# Patient Record
Sex: MALE | Race: WHITE | NOT HISPANIC OR LATINO | Employment: STUDENT | URBAN - METROPOLITAN AREA
[De-identification: names, ages, dates, MRNs, and addresses within clinical notes are randomized per-mention and may not be internally consistent; named-entity substitution may affect disease eponyms.]

---
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
## 2022-04-30 ENCOUNTER — APPOINTMENT (OUTPATIENT)
Dept: RADIOLOGY | Age: 22
End: 2022-04-30
Payer: OTHER MISCELLANEOUS

## 2022-04-30 ENCOUNTER — OCCMED (OUTPATIENT)
Dept: URGENT CARE | Age: 22
End: 2022-04-30
Payer: OTHER MISCELLANEOUS

## 2022-04-30 DIAGNOSIS — M25.562 ACUTE PAIN OF LEFT KNEE: ICD-10-CM

## 2022-04-30 DIAGNOSIS — M25.562 ACUTE PAIN OF LEFT KNEE: Primary | ICD-10-CM

## 2022-04-30 PROCEDURE — 99283 EMERGENCY DEPT VISIT LOW MDM: CPT

## 2022-04-30 PROCEDURE — G0382 LEV 3 HOSP TYPE B ED VISIT: HCPCS

## 2022-04-30 PROCEDURE — 73564 X-RAY EXAM KNEE 4 OR MORE: CPT

## 2022-07-22 ENCOUNTER — APPOINTMENT (OUTPATIENT)
Dept: URGENT CARE | Facility: CLINIC | Age: 22
End: 2022-07-22
Payer: OTHER MISCELLANEOUS

## 2022-07-22 PROCEDURE — G0382 LEV 3 HOSP TYPE B ED VISIT: HCPCS | Performed by: PHYSICIAN ASSISTANT

## 2022-07-22 PROCEDURE — 99283 EMERGENCY DEPT VISIT LOW MDM: CPT

## 2022-07-22 PROCEDURE — G0382 LEV 3 HOSP TYPE B ED VISIT: HCPCS

## 2022-07-26 ENCOUNTER — OFFICE VISIT (OUTPATIENT)
Dept: URGENT CARE | Facility: CLINIC | Age: 22
End: 2022-07-26
Payer: MEDICARE

## 2022-07-26 VITALS
SYSTOLIC BLOOD PRESSURE: 117 MMHG | WEIGHT: 142.8 LBS | RESPIRATION RATE: 16 BRPM | DIASTOLIC BLOOD PRESSURE: 73 MMHG | BODY MASS INDEX: 20.44 KG/M2 | TEMPERATURE: 97.6 F | HEART RATE: 77 BPM | HEIGHT: 70 IN | OXYGEN SATURATION: 97 %

## 2022-07-26 DIAGNOSIS — H10.13 ALLERGIC CONJUNCTIVITIS OF BOTH EYES: Primary | ICD-10-CM

## 2022-07-26 PROCEDURE — G0382 LEV 3 HOSP TYPE B ED VISIT: HCPCS | Performed by: PHYSICIAN ASSISTANT

## 2022-07-26 RX ORDER — OLOPATADINE HYDROCHLORIDE 1 MG/ML
1 SOLUTION/ DROPS OPHTHALMIC 2 TIMES DAILY
Qty: 5 ML | Refills: 0 | Status: SHIPPED | OUTPATIENT
Start: 2022-07-26

## 2022-07-26 NOTE — PROGRESS NOTES
[facility]        NAME: Tory Cotton is a 23 y o  male  : 2000    MRN: [id number]  DATE: 2022  TIME: 5:27 PM    Assessment and Plan   Allergic conjunctivitis of both eyes [H10 13]  1  Allergic conjunctivitis of both eyes  olopatadine (PATANOL) 0 1 % ophthalmic solution         Patient Instructions       Follow up with PCP in 3-5 days  Proceed to  ER if symptoms worsen  Chief Complaint     Chief Complaint   Patient presents with    Eye Problem     B/L eyes are red started 3 weeks ago  History of Present Illness       Patient is a 24 yo male who presents with a cc of bilateral eye redness x 3 weeks  Associated watery discharge and itching  He reports that the symptoms have been occurring off and on and that 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  He does have seasonal allergies  Denies eye pain, visual changes, light sensitivity, foreign body sensation       Review of Systems   Review of Systems   Constitutional: Negative for fever  Eyes: Positive for discharge, redness and itching  Negative for photophobia, pain and visual disturbance  Neurological: Negative for dizziness and headaches           Current Medications       Current Outpatient Medications:     olopatadine (PATANOL) 0 1 % ophthalmic solution, Administer 1 drop to both eyes 2 (two) times a day, Disp: 5 mL, Rfl: 0    benzonatate (TESSALON PERLES) 100 mg capsule, Take 1 capsule (100 mg total) by mouth 3 (three) times a day as needed for cough (Patient not taking: Reported on 2022), Disp: 20 capsule, Rfl: 0    Current Allergies     Allergies as of 2022 - Reviewed 2022   Allergen Reaction Noted    Penicillins  2017            The following portions of the patient's history were reviewed and updated as appropriate: allergies, current medications, past family history, past medical history, past social history, past surgical history and problem list      Past Medical History:   Diagnosis Date    ADHD (attention deficit hyperactivity disorder)        History reviewed  No pertinent surgical history  No family history on file  Medications have been verified  Objective   /73   Pulse 77   Temp 97 6 °F (36 4 °C)   Resp 16   Ht 5' 10" (1 778 m)   Wt 64 8 kg (142 lb 12 8 oz)   SpO2 97%   BMI 20 49 kg/m²        Physical Exam     Physical Exam  Constitutional:       General: He is not in acute distress  Appearance: He is not toxic-appearing  Eyes:      Extraocular Movements: Extraocular movements intact  Pupils: Pupils are equal, round, and reactive to light  Comments: Bilateral conjunctival erythema and watery discharge    Cardiovascular:      Rate and Rhythm: Normal rate  Pulmonary:      Effort: Pulmonary effort is normal    Skin:     General: Skin is warm and dry  Neurological:      Mental Status: He is alert     Psychiatric:         Mood and Affect: Mood normal          Behavior: Behavior normal

## 2024-07-18 ENCOUNTER — APPOINTMENT (EMERGENCY)
Dept: RADIOLOGY | Facility: HOSPITAL | Age: 24
End: 2024-07-18
Payer: COMMERCIAL

## 2024-07-18 ENCOUNTER — HOSPITAL ENCOUNTER (EMERGENCY)
Facility: HOSPITAL | Age: 24
Discharge: HOME | End: 2024-07-18
Payer: COMMERCIAL

## 2024-07-18 VITALS
RESPIRATION RATE: 18 BRPM | BODY MASS INDEX: 21.47 KG/M2 | OXYGEN SATURATION: 100 % | WEIGHT: 150 LBS | DIASTOLIC BLOOD PRESSURE: 57 MMHG | SYSTOLIC BLOOD PRESSURE: 109 MMHG | HEIGHT: 70 IN | HEART RATE: 74 BPM | TEMPERATURE: 98.1 F

## 2024-07-18 DIAGNOSIS — S51.811A LACERATION OF RIGHT FOREARM, INITIAL ENCOUNTER: Primary | ICD-10-CM

## 2024-07-18 PROCEDURE — 73090 X-RAY EXAM OF FOREARM: CPT

## 2024-07-18 PROCEDURE — 90471 IMMUNIZATION ADMIN: CPT

## 2024-07-18 PROCEDURE — 99284 EMERGENCY DEPT VISIT MOD MDM: CPT | Performed by: PHYSICIAN ASSISTANT

## 2024-07-18 PROCEDURE — 90715 TDAP VACCINE 7 YRS/> IM: CPT | Performed by: PHYSICIAN ASSISTANT

## 2024-07-18 PROCEDURE — 99283 EMERGENCY DEPT VISIT LOW MDM: CPT

## 2024-07-18 RX ORDER — ACETAMINOPHEN 325 MG/1
975 TABLET ORAL ONCE
Status: COMPLETED | OUTPATIENT
Start: 2024-07-18 | End: 2024-07-18

## 2024-07-18 RX ORDER — LIDOCAINE HYDROCHLORIDE AND EPINEPHRINE 10; 10 MG/ML; UG/ML
10 INJECTION, SOLUTION INFILTRATION; PERINEURAL ONCE
Status: COMPLETED | OUTPATIENT
Start: 2024-07-18 | End: 2024-07-18

## 2024-07-18 RX ORDER — IBUPROFEN 400 MG/1
400 TABLET ORAL ONCE
Status: COMPLETED | OUTPATIENT
Start: 2024-07-18 | End: 2024-07-18

## 2024-07-18 RX ORDER — DOXYCYCLINE HYCLATE 100 MG/1
100 CAPSULE ORAL 2 TIMES DAILY
Qty: 20 CAPSULE | Refills: 0 | Status: SHIPPED | OUTPATIENT
Start: 2024-07-18 | End: 2024-07-25

## 2024-07-18 RX ADMIN — LIDOCAINE HYDROCHLORIDE,EPINEPHRINE BITARTRATE 10 ML: 10; .01 INJECTION, SOLUTION INFILTRATION; PERINEURAL at 17:20

## 2024-07-18 RX ADMIN — TETANUS TOXOID, REDUCED DIPHTHERIA TOXOID AND ACELLULAR PERTUSSIS VACCINE, ADSORBED 0.5 ML: 5; 2.5; 8; 8; 2.5 SUSPENSION INTRAMUSCULAR at 17:20

## 2024-07-18 RX ADMIN — IBUPROFEN 400 MG: 400 TABLET, FILM COATED ORAL at 17:20

## 2024-07-18 RX ADMIN — ACETAMINOPHEN 975 MG: 325 TABLET, FILM COATED ORAL at 17:20

## 2024-07-18 NOTE — ED PROVIDER NOTES
History  Chief Complaint   Patient presents with    Laceration     From [facility] via ems for R forearm lac. Pt was removing parts from car at [employer] and glass shattered puncturing his R forearm. He wrapped it and went to express care. At express care he felt syncopal when he looked at the wound. Denies LOC or syncopal episode. Bleeding is controlled. Denies thinners       Laceration  Location:  Shoulder/arm  Shoulder/arm laceration location:  R forearm  Length:  2.5  Depth:  Through dermis  Quality: stellate    Bleeding: venous    Laceration mechanism:  Blunt object and broken glass  Pain details:     Quality:  Aching    Severity:  Mild    Progression:  Improving  Foreign body present:  No foreign bodies  Tetanus status:  Unknown  Associated symptoms: no fever, no focal weakness and no numbness        Prior to Admission Medications   Prescriptions Last Dose Informant Patient Reported? Taking?   benzonatate (TESSALON PERLES) 100 mg capsule   No No   Sig: Take 1 capsule (100 mg total) by mouth 3 (three) times a day as needed for cough   Patient not taking: Reported on 7/26/2022   olopatadine (PATANOL) 0.1 % ophthalmic solution   No No   Sig: Administer 1 drop to both eyes 2 (two) times a day      Facility-Administered Medications: None       Past Medical History:   Diagnosis Date    ADHD (attention deficit hyperactivity disorder)        History reviewed. No pertinent surgical history.    History reviewed. No pertinent family history.  I have reviewed and agree with the history as documented.    E-Cigarette/Vaping    E-Cigarette Use Never User      E-Cigarette/Vaping Substances    Nicotine No     THC No     CBD No     Flavoring No     Other No     Unknown No      Social History     Tobacco Use    Smoking status: Some Days     Types: Cigarettes, Cigars    Smokeless tobacco: Never   Vaping Use    Vaping status: Never Used   Substance Use Topics    Alcohol use: Yes     Comment: social    Drug use: Never        Review of Systems   Constitutional:  Negative for fever.   Skin:  Positive for wound.   Neurological:  Negative for focal weakness.   All other systems reviewed and are negative.      Physical Exam  Physical Exam  Vitals reviewed.   Constitutional:       General: He is not in acute distress.     Appearance: Normal appearance. He is not ill-appearing.   HENT:      Head: Normocephalic and atraumatic.      Right Ear: External ear normal.      Left Ear: External ear normal.      Nose: Nose normal.      Mouth/Throat:      Pharynx: Oropharynx is clear.   Eyes:      Conjunctiva/sclera: Conjunctivae normal.   Cardiovascular:      Rate and Rhythm: Normal rate.   Pulmonary:      Effort: Pulmonary effort is normal.   Abdominal:      General: There is no distension.   Musculoskeletal:         General: Tenderness and signs of injury present. Normal range of motion.      Cervical back: Normal range of motion.   Skin:     General: Skin is warm and dry.      Capillary Refill: Capillary refill takes less than 2 seconds.   Neurological:      General: No focal deficit present.      Mental Status: He is alert and oriented to person, place, and time.      Sensory: No sensory deficit (Full sensation posterior aspect of right hand.).      Motor: No weakness (Specifically at the flexion and extension right wrist as well as  with the right hand.).   Psychiatric:         Mood and Affect: Mood normal.         Behavior: Behavior normal.         Vital Signs  ED Triage Vitals   Temperature Pulse Respirations Blood Pressure SpO2   07/18/24 1702 07/18/24 1702 07/18/24 1702 07/18/24 1702 07/18/24 1702   98.1 °F (36.7 °C) 74 18 109/57 100 %      Temp Source Heart Rate Source Patient Position - Orthostatic VS BP Location FiO2 (%)   07/18/24 1702 07/18/24 1702 07/18/24 1702 07/18/24 1702 --   Oral Monitor Lying Left arm       Pain Score       07/18/24 1700       4           Vitals:    07/18/24 1702   BP: 109/57   Pulse: 74   Patient Position  - Orthostatic VS: Lying         Visual Acuity      ED Medications  Medications   ibuprofen (MOTRIN) tablet 400 mg (400 mg Oral Given 7/18/24 1720)   acetaminophen (TYLENOL) tablet 975 mg (975 mg Oral Given 7/18/24 1720)   tetanus-diphtheria-acellular pertussis (BOOSTRIX) IM injection 0.5 mL (0.5 mL Intramuscular Given 7/18/24 1720)   lidocaine-epinephrine (XYLOCAINE/EPINEPHRINE) 1 %-1:100,000 injection 10 mL (10 mL Infiltration Given 7/18/24 1720)       Diagnostic Studies  Results Reviewed       None                   XR forearm 2 views RIGHT   ED Interpretation by Mayco Tolentino PA-C (07/18 1826)   NO fb noted        XR forearm 2 views RIGHT   ED Interpretation by Mayco Tolentino PA-C (07/18 1734)   No foreign body appreciated.  Density most likely secondary to Coban wrap.                 Procedures  Universal Protocol:  Consent: Verbal consent obtained.  Consent given by: patient and parent  Patient understanding: patient states understanding of the procedure being performed  Patient consent: the patient's understanding of the procedure matches consent given  Laceration repair    Date/Time: 7/18/2024 6:06 PM    Performed by: Mayco Tolentino PA-C  Authorized by: Macyo Tolentino PA-C  Body area: upper extremity  Location details: right lower arm  Wound length (cm): Stellate laceration with combined length of 2.5 cm.  Foreign bodies: no foreign bodies  Anesthesia: hematoma block and local infiltration    Anesthesia:  Local Anesthetic: lidocaine 1% with epinephrine    Sedation:  Patient sedated: no      Wound Dehiscence:  Superficial Wound Dehiscence: simple closure      Procedure Details:  Irrigation method: jet lavage and syringe  Amount of cleaning: extensive  Skin closure: 4-0 nylon  Number of sutures: 5  Technique: simple  Approximation: close  Approximation difficulty: simple  Dressing: Band-Aid bulky dressing.  Patient tolerance: patient tolerated the procedure well with no immediate complications  Foreign  body: No obvious foreign bodies were appreciated during irrigation.             ED Course                                 SBIRT 20yo+      Flowsheet Row Most Recent Value   Initial Alcohol Screen: US AUDIT-C     1. How often do you have a drink containing alcohol? 2 Filed at: 07/18/2024 1702   2. How many drinks containing alcohol do you have on a typical day you are drinking?  1 Filed at: 07/18/2024 1702   3a. Male UNDER 65: How often do you have five or more drinks on one occasion? 0 Filed at: 07/18/2024 1702   Audit-C Score 3 Filed at: 07/18/2024 1702   ARY: How many times in the past year have you...    Used an illegal drug or used a prescription medication for non-medical reasons? Never Filed at: 07/18/2024 1702                      Medical Decision Making  24-year-old male presents emergency department for right arm burst laceration secondary to traumatic incident this day.  Patient states that he was working with a vehicle and had obtained this laceration.  Updated tetanus this day.  Wound was cleaned and closed without difficulty.  X-rays obtained x 2 with no obvious foreign body.  During irrigation of wound no obvious foreign bodies were removed from wound.  Educated patient on timing for suture removal.  Due to the nature of the injury and potential contaminated wound despite thorough irrigation and cleaning will put patient on antibiotics.  Patient has allergy to penicillins as a child encountering rash with no overt Miller area and or allergic reaction.  Educated patient on home wound care.  Educated on timing for suture removal.  Educated on any signs or symptoms of infection and to seek medical management and evaluation.  Patient and mother with understanding agreement the patient was discharged in healthy-appearing improved ambulatory condition.    Amount and/or Complexity of Data Reviewed  Radiology: ordered and independent interpretation performed.    Risk  OTC drugs.  Prescription drug  management.                 Disposition  Final diagnoses:   Laceration of right forearm, initial encounter     Time reflects when diagnosis was documented in both MDM as applicable and the Disposition within this note       Time User Action Codes Description Comment    7/18/2024  6:11 PM Mayco Tolentino Add [S51.811A] Laceration of right forearm, initial encounter           ED Disposition       ED Disposition   Discharge    Condition   Stable    Date/Time   Thu Jul 18, 2024 1806    Comment   Law Verdugo discharge to home/self care.                   Follow-up Information       Follow up With Specialties Details Why Contact Info    Margaret Chong MD Orthopedic Surgery, Hand Surgery   [address]  [address]  [facility] [address] [phone]              Discharge Medication List as of 7/18/2024  6:12 PM        START taking these medications    Details   doxycycline hyclate (VIBRAMYCIN) 100 mg capsule Take 1 capsule (100 mg total) by mouth 2 (two) times a day for 7 days, Starting Thu 7/18/2024, Until Thu 7/25/2024, Normal           CONTINUE these medications which have NOT CHANGED    Details   benzonatate (TESSALON PERLES) 100 mg capsule Take 1 capsule (100 mg total) by mouth 3 (three) times a day as needed for cough, Starting Tue 3/26/2019, Print      olopatadine (PATANOL) 0.1 % ophthalmic solution Administer 1 drop to both eyes 2 (two) times a day, Starting Tue 7/26/2022, Normal             No discharge procedures on file.    PDMP Review       None            ED Provider  Electronically Signed by             Mayco Tolentino PA-C  07/18/24 3047